# Patient Record
Sex: FEMALE | Race: WHITE
[De-identification: names, ages, dates, MRNs, and addresses within clinical notes are randomized per-mention and may not be internally consistent; named-entity substitution may affect disease eponyms.]

---

## 2018-01-30 ENCOUNTER — HOSPITAL ENCOUNTER (INPATIENT)
Dept: HOSPITAL 25 - MCHOBOUT | Age: 33
LOS: 3 days | Discharge: HOME | DRG: 540 | End: 2018-02-02
Attending: OBSTETRICS & GYNECOLOGY | Admitting: OBSTETRICS & GYNECOLOGY
Payer: COMMERCIAL

## 2018-01-30 DIAGNOSIS — O34.211: ICD-10-CM

## 2018-01-30 DIAGNOSIS — Z3A.39: ICD-10-CM

## 2018-01-30 DIAGNOSIS — F32.9: ICD-10-CM

## 2018-01-30 LAB
BASOPHILS # BLD AUTO: 0 10^3/UL (ref 0–0.2)
EOSINOPHIL # BLD AUTO: 0.1 10^3/UL (ref 0–0.6)
HCT VFR BLD AUTO: 34 % (ref 35–47)
HGB BLD-MCNC: 11.7 G/DL (ref 12–16)
LYMPHOCYTES # BLD AUTO: 0.9 10^3/UL (ref 1–4.8)
MCH RBC QN AUTO: 28 PG (ref 27–31)
MCHC RBC AUTO-ENTMCNC: 34 G/DL (ref 31–36)
MCV RBC AUTO: 81 FL (ref 80–97)
MONOCYTES # BLD AUTO: 0.5 10^3/UL (ref 0–0.8)
NEUTROPHILS # BLD AUTO: 5 10^3/UL (ref 1.5–7.7)
NRBC # BLD AUTO: 0 10^3/UL
NRBC BLD QL AUTO: 0.1
PLATELET # BLD AUTO: 254 10^3/UL (ref 150–450)
RBC # BLD AUTO: 4.23 10^6/UL (ref 4–5.4)
WBC # BLD AUTO: 6.6 10^3/UL (ref 3.5–10.8)

## 2018-01-30 PROCEDURE — 86900 BLOOD TYPING SEROLOGIC ABO: CPT

## 2018-01-30 PROCEDURE — 4A1HX4Z MONITORING OF PRODUCTS OF CONCEPTION, CARDIAC ELECTRICAL ACTIVITY, EXTERNAL APPROACH: ICD-10-PCS | Performed by: OBSTETRICS & GYNECOLOGY

## 2018-01-30 PROCEDURE — 88307 TISSUE EXAM BY PATHOLOGIST: CPT

## 2018-01-30 PROCEDURE — 86901 BLOOD TYPING SEROLOGIC RH(D): CPT

## 2018-01-30 PROCEDURE — 10907ZC DRAINAGE OF AMNIOTIC FLUID, THERAPEUTIC FROM PRODUCTS OF CONCEPTION, VIA NATURAL OR ARTIFICIAL OPENING: ICD-10-PCS | Performed by: OBSTETRICS & GYNECOLOGY

## 2018-01-30 PROCEDURE — 85025 COMPLETE CBC W/AUTO DIFF WBC: CPT

## 2018-01-30 PROCEDURE — 86850 RBC ANTIBODY SCREEN: CPT

## 2018-01-30 PROCEDURE — 36415 COLL VENOUS BLD VENIPUNCTURE: CPT

## 2018-01-30 RX ADMIN — IBUPROFEN PRN MG: 600 TABLET, FILM COATED ORAL at 23:16

## 2018-01-30 RX ADMIN — SIMETHICONE CHEW TAB 80 MG SCH: 80 TABLET ORAL at 20:10

## 2018-01-30 RX ADMIN — OXYCODONE HYDROCHLORIDE AND ACETAMINOPHEN PRN TAB: 5; 325 TABLET ORAL at 20:10

## 2018-01-30 RX ADMIN — SIMETHICONE CHEW TAB 80 MG SCH MG: 80 TABLET ORAL at 20:10

## 2018-01-30 RX ADMIN — DOCUSATE SODIUM SCH MG: 100 CAPSULE, LIQUID FILLED ORAL at 20:10

## 2018-01-30 RX ADMIN — FENTANYL CITRATE PRN MCG: 0.05 INJECTION, SOLUTION INTRAMUSCULAR; INTRAVENOUS at 15:43

## 2018-01-30 RX ADMIN — FENTANYL CITRATE PRN MCG: 0.05 INJECTION, SOLUTION INTRAMUSCULAR; INTRAVENOUS at 15:23

## 2018-01-31 LAB
BASOPHILS # BLD AUTO: 0.1 10^3/UL (ref 0–0.2)
EOSINOPHIL # BLD AUTO: 0.1 10^3/UL (ref 0–0.6)
HCT VFR BLD AUTO: 31 % (ref 35–47)
HGB BLD-MCNC: 10.5 G/DL (ref 12–16)
LYMPHOCYTES # BLD AUTO: 0.7 10^3/UL (ref 1–4.8)
MCH RBC QN AUTO: 27 PG (ref 27–31)
MCHC RBC AUTO-ENTMCNC: 34 G/DL (ref 31–36)
MCV RBC AUTO: 81 FL (ref 80–97)
MONOCYTES # BLD AUTO: 1 10^3/UL (ref 0–0.8)
NEUTROPHILS # BLD AUTO: 9.7 10^3/UL (ref 1.5–7.7)
NRBC # BLD AUTO: 0 10^3/UL
NRBC BLD QL AUTO: 0
PLATELET # BLD AUTO: 224 10^3/UL (ref 150–450)
RBC # BLD AUTO: 3.86 10^6/UL (ref 4–5.4)
WBC # BLD AUTO: 11.5 10^3/UL (ref 3.5–10.8)

## 2018-01-31 RX ADMIN — DOCUSATE SODIUM SCH MG: 100 CAPSULE, LIQUID FILLED ORAL at 13:57

## 2018-01-31 RX ADMIN — OXYCODONE HYDROCHLORIDE AND ACETAMINOPHEN PRN TAB: 5; 325 TABLET ORAL at 17:58

## 2018-01-31 RX ADMIN — FLUOXETINE SCH MG: 10 CAPSULE ORAL at 13:07

## 2018-01-31 RX ADMIN — IBUPROFEN PRN MG: 600 TABLET, FILM COATED ORAL at 06:37

## 2018-01-31 RX ADMIN — OXYCODONE HYDROCHLORIDE AND ACETAMINOPHEN PRN TAB: 5; 325 TABLET ORAL at 00:11

## 2018-01-31 RX ADMIN — HYDROCORTISONE SCH APPLIC: 1 CREAM TOPICAL at 18:48

## 2018-01-31 RX ADMIN — IBUPROFEN PRN MG: 600 TABLET, FILM COATED ORAL at 17:57

## 2018-01-31 RX ADMIN — SIMETHICONE CHEW TAB 80 MG SCH MG: 80 TABLET ORAL at 22:20

## 2018-01-31 RX ADMIN — DOCUSATE SODIUM SCH MG: 100 CAPSULE, LIQUID FILLED ORAL at 22:20

## 2018-01-31 RX ADMIN — SIMETHICONE CHEW TAB 80 MG SCH MG: 80 TABLET ORAL at 12:42

## 2018-01-31 RX ADMIN — OXYCODONE HYDROCHLORIDE AND ACETAMINOPHEN PRN TAB: 5; 325 TABLET ORAL at 22:20

## 2018-01-31 RX ADMIN — SIMETHICONE CHEW TAB 80 MG SCH MG: 80 TABLET ORAL at 17:57

## 2018-01-31 RX ADMIN — OXYCODONE HYDROCHLORIDE AND ACETAMINOPHEN PRN TAB: 5; 325 TABLET ORAL at 06:37

## 2018-01-31 RX ADMIN — OXYCODONE HYDROCHLORIDE AND ACETAMINOPHEN PRN TAB: 5; 325 TABLET ORAL at 13:57

## 2018-01-31 RX ADMIN — SIMETHICONE CHEW TAB 80 MG SCH MG: 80 TABLET ORAL at 10:02

## 2018-01-31 RX ADMIN — DOCUSATE SODIUM SCH MG: 100 CAPSULE, LIQUID FILLED ORAL at 10:02

## 2018-01-31 RX ADMIN — OXYCODONE HYDROCHLORIDE AND ACETAMINOPHEN PRN TAB: 5; 325 TABLET ORAL at 10:01

## 2018-01-31 RX ADMIN — IBUPROFEN PRN MG: 600 TABLET, FILM COATED ORAL at 12:42

## 2018-02-01 RX ADMIN — OXYCODONE HYDROCHLORIDE AND ACETAMINOPHEN PRN TAB: 5; 325 TABLET ORAL at 07:44

## 2018-02-01 RX ADMIN — OXYCODONE HYDROCHLORIDE AND ACETAMINOPHEN PRN TAB: 5; 325 TABLET ORAL at 12:12

## 2018-02-01 RX ADMIN — IBUPROFEN PRN MG: 600 TABLET, FILM COATED ORAL at 21:13

## 2018-02-01 RX ADMIN — SIMETHICONE CHEW TAB 80 MG SCH MG: 80 TABLET ORAL at 12:12

## 2018-02-01 RX ADMIN — DOCUSATE SODIUM SCH MG: 100 CAPSULE, LIQUID FILLED ORAL at 15:18

## 2018-02-01 RX ADMIN — DOCUSATE SODIUM SCH MG: 100 CAPSULE, LIQUID FILLED ORAL at 21:14

## 2018-02-01 RX ADMIN — SIMETHICONE CHEW TAB 80 MG SCH MG: 80 TABLET ORAL at 21:13

## 2018-02-01 RX ADMIN — IBUPROFEN PRN MG: 600 TABLET, FILM COATED ORAL at 00:36

## 2018-02-01 RX ADMIN — OXYCODONE HYDROCHLORIDE AND ACETAMINOPHEN PRN TAB: 5; 325 TABLET ORAL at 16:56

## 2018-02-01 RX ADMIN — DOCUSATE SODIUM SCH MG: 100 CAPSULE, LIQUID FILLED ORAL at 08:49

## 2018-02-01 RX ADMIN — IBUPROFEN PRN MG: 600 TABLET, FILM COATED ORAL at 07:43

## 2018-02-01 RX ADMIN — OXYCODONE HYDROCHLORIDE AND ACETAMINOPHEN PRN TAB: 5; 325 TABLET ORAL at 02:58

## 2018-02-01 RX ADMIN — FLUOXETINE SCH MG: 10 CAPSULE ORAL at 13:10

## 2018-02-01 RX ADMIN — SIMETHICONE CHEW TAB 80 MG SCH MG: 80 TABLET ORAL at 08:48

## 2018-02-01 RX ADMIN — IBUPROFEN PRN MG: 600 TABLET, FILM COATED ORAL at 15:18

## 2018-02-02 VITALS — SYSTOLIC BLOOD PRESSURE: 125 MMHG | DIASTOLIC BLOOD PRESSURE: 69 MMHG

## 2018-02-02 RX ADMIN — DOCUSATE SODIUM SCH MG: 100 CAPSULE, LIQUID FILLED ORAL at 08:18

## 2018-02-02 RX ADMIN — OXYCODONE HYDROCHLORIDE AND ACETAMINOPHEN PRN TAB: 5; 325 TABLET ORAL at 00:22

## 2018-02-02 RX ADMIN — SIMETHICONE CHEW TAB 80 MG SCH MG: 80 TABLET ORAL at 12:18

## 2018-02-02 RX ADMIN — SIMETHICONE CHEW TAB 80 MG SCH MG: 80 TABLET ORAL at 08:18

## 2018-02-02 RX ADMIN — FLUOXETINE SCH MG: 10 CAPSULE ORAL at 10:57

## 2018-02-02 RX ADMIN — HYDROCORTISONE SCH: 1 CREAM TOPICAL at 09:55

## 2018-02-02 RX ADMIN — IBUPROFEN PRN MG: 600 TABLET, FILM COATED ORAL at 10:18

## 2018-02-02 RX ADMIN — IBUPROFEN PRN MG: 600 TABLET, FILM COATED ORAL at 03:54

## 2018-02-02 RX ADMIN — OXYCODONE HYDROCHLORIDE AND ACETAMINOPHEN PRN TAB: 5; 325 TABLET ORAL at 08:18

## 2018-02-02 RX ADMIN — OXYCODONE HYDROCHLORIDE AND ACETAMINOPHEN PRN TAB: 5; 325 TABLET ORAL at 12:18

## 2018-02-02 RX ADMIN — SIMETHICONE CHEW TAB 80 MG SCH: 80 TABLET ORAL at 08:11

## 2018-02-02 RX ADMIN — HYDROCORTISONE SCH: 1 CREAM TOPICAL at 08:11

## 2018-02-03 ENCOUNTER — HOSPITAL ENCOUNTER (EMERGENCY)
Dept: HOSPITAL 25 - ED | Age: 33
Discharge: HOME | End: 2018-02-03
Payer: COMMERCIAL

## 2018-02-03 VITALS — SYSTOLIC BLOOD PRESSURE: 136 MMHG | DIASTOLIC BLOOD PRESSURE: 80 MMHG

## 2018-02-03 LAB
BASOPHILS # BLD AUTO: 0 10^3/UL (ref 0–0.2)
EOSINOPHIL # BLD AUTO: 0.1 10^3/UL (ref 0–0.6)
HCT VFR BLD AUTO: 30 % (ref 35–47)
HGB BLD-MCNC: 9.8 G/DL (ref 12–16)
INR PPP/BLD: 0.91 (ref 0.77–1.02)
LYMPHOCYTES # BLD AUTO: 0.5 10^3/UL (ref 1–4.8)
MCH RBC QN AUTO: 27 PG (ref 27–31)
MCHC RBC AUTO-ENTMCNC: 33 G/DL (ref 31–36)
MCV RBC AUTO: 81 FL (ref 80–97)
MONOCYTES # BLD AUTO: 0.4 10^3/UL (ref 0–0.8)
NEUTROPHILS # BLD AUTO: 6.9 10^3/UL (ref 1.5–7.7)
NRBC # BLD AUTO: 0 10^3/UL
NRBC BLD QL AUTO: 0.1
PLATELET # BLD AUTO: 273 10^3/UL (ref 150–450)
RBC # BLD AUTO: 3.64 10^6/UL (ref 4–5.4)
WBC # BLD AUTO: 7.9 10^3/UL (ref 3.5–10.8)

## 2018-02-03 PROCEDURE — 81003 URINALYSIS AUTO W/O SCOPE: CPT

## 2018-02-03 PROCEDURE — 36415 COLL VENOUS BLD VENIPUNCTURE: CPT

## 2018-02-03 PROCEDURE — 85610 PROTHROMBIN TIME: CPT

## 2018-02-03 PROCEDURE — 87086 URINE CULTURE/COLONY COUNT: CPT

## 2018-02-03 PROCEDURE — 87502 INFLUENZA DNA AMP PROBE: CPT

## 2018-02-03 PROCEDURE — 85025 COMPLETE CBC W/AUTO DIFF WBC: CPT

## 2018-02-03 PROCEDURE — 96360 HYDRATION IV INFUSION INIT: CPT

## 2018-02-03 PROCEDURE — 84484 ASSAY OF TROPONIN QUANT: CPT

## 2018-02-03 PROCEDURE — 83605 ASSAY OF LACTIC ACID: CPT

## 2018-02-03 PROCEDURE — 80053 COMPREHEN METABOLIC PANEL: CPT

## 2018-02-03 PROCEDURE — 85730 THROMBOPLASTIN TIME PARTIAL: CPT

## 2018-02-03 PROCEDURE — 81015 MICROSCOPIC EXAM OF URINE: CPT

## 2018-02-03 PROCEDURE — 71046 X-RAY EXAM CHEST 2 VIEWS: CPT

## 2018-02-03 PROCEDURE — 99283 EMERGENCY DEPT VISIT LOW MDM: CPT

## 2018-02-03 PROCEDURE — 87040 BLOOD CULTURE FOR BACTERIA: CPT

## 2018-02-03 RX ADMIN — SODIUM CHLORIDE ONE ML: 900 IRRIGANT IRRIGATION at 06:28

## 2018-02-03 RX ADMIN — SODIUM CHLORIDE ONE ML: 900 IRRIGANT IRRIGATION at 07:59

## 2018-02-03 NOTE — RAD
INDICATION: Fever 3 days status post 



COMPARISON: None



TECHNIQUE: PA and lateral views of the chest were obtained.



FINDINGS:



The heart and mediastinum are normal in size and contour.



The lungs are grossly clear. There is no evidence of large pleural effusion.



Visualized bones are normal for the patient's age.



There is no radiographic evidence of free air beneath the diaphragm



IMPRESSION: 

No radiographic evidence of acute cardiopulmonary disease.

## 2018-02-07 NOTE — ED
Sanjay WILLIAMSON Tecjoon, scribed for Leonel Up MD on 18 at 0528 .





HPI Febrile Illness





- HPI Summary


HPI Summary: 





This patient is a 32 year old female presenting to [facility] accompanied by family 

with a chief complaint of fever since yesterday. Patient recently gave birth to 

her second child 4 days ago. Child was born through emergency  due to 

a prolapsed umbilical cord. Patients child was full term, at 39 weeks. Patient 

was discharged yesterday and woke up with a high fever. The pain is rated 3/10 

in severity. Symptoms aggravated by nothing. Symptoms alleviated by nothing


The patient treated the pain with Percocet and ibuprofen on and off while in 

the hospital. Patient additionally reports a mild cough. Patient denies nausea, 

vomiting, dysuria, SOB.





- History of Current Complaint


Chief Complaint: EDFever


Hx Obtained From: Patient


Hx Last Menstrual Period: 14


Onset/Duration: Started Days Ago, Still Present


Timing: Constant


Temperature: 40 C - when she "woke up"


Initial Severity: Moderate


Current Severity: Mild


Pain Intensity: 3


Pain Scale Used: 0-10 Numeric


Aggravating Factors: Nothing


Alleviating Factors: Nothing


Associated Signs and Symptoms: Negative -  nausea, vomiting, dysuria, SOB, Other

: - mild cough





- Allergy/Home Medications


Allergies/Adverse Reactions: 


 Allergies











Allergy/AdvReac Type Severity Reaction Status Date / Time


 


No Known Allergies Allergy   Verified 18 03:32














PMH/Surg Hx/FS Hx/Imm Hx


Previously Healthy: Yes


Endocrine/Hematology History: 


   Denies: Hx Anticoagulant Therapy, Hx Blood Disorders, Hx Diabetes, Hx 

Thyroid Disease


Cardiovascular History: 


   Denies: Hx Hypertension


Respiratory History: 


   Denies: Hx Asthma, Hx Chronic Obstructive Pulmonary Disease (COPD)


GI History: 


   Denies: Hx Ulcer


Musculoskeletal History: 


   Denies: Hx Scoliosis


Sensory History: Reports: Hx Contacts or Glasses - GLASSES


   Denies: Hx Hearing Aid


Opthamlomology History: Reports: Hx Contacts or Glasses - GLASSES


Neurological History: Reports: Hx Headaches, Hx Migraine - 1.5 YEARS AGO


   Denies: Other Neuro Impairments/Disorders


Psychiatric History: Reports: Hx Anxiety, Hx Depression





- Surgical History


Surgery Procedure, Year, and Place: left arm surgery x2 /.  cyst 

removed from neck x2.   


Hx Anesthesia Reactions: No





- Immunization History


Date of Influenza Vaccine: 2017


Infectious Disease History: No


Infectious Disease History: 


   Denies: Hx Clostridium Difficile, Hx Hepatitis, Hx Human Immunodeficiency 

Virus (HIV), Hx of Known/Suspected MRSA, Hx Shingles, Hx Tuberculosis, History 

Other Infectious Disease, Traveled Outside the [place] in Last 30 Days





- Family History


Known Family History: 


   Negative: Hypertension





- Social History


Lives: With Family


Alcohol Use: Occasionally


Hx Substance Use: No


Substance Use Type: Reports: None


Hx Tobacco Use: No


Smoking Status (MU): Never Smoked Tobacco


Have You Smoked in the Last Year: No





Review of Systems


Positive: Fever - yesterday, 104


Positive: Cough.  Negative: Shortness Of Breath


Negative: Vomiting, Nausea


Negative: dysuria


All Other Systems Reviewed And Are Negative: Yes





Physical Exam





- Summary


Physical Exam Summary: 





Appearance: Well-appearing, Well-nourished


Skin: Warm


Eyes: Normal


ENT: Normal


Neck: Supple, nontender


Respiratory: Clear to auscultation


Cardiovascular: Normal S1, S2. No murmurs. Normal distal pulses in tibial and 

radial bilaterally.


Abdomen: Soft, nontender


Musculoskeletal: Normal, Strength/ROM Intact


Neurological: Normal, A&Ox3


Psychiatric: Normal


Triage Information Reviewed: Yes


Vital Signs On Initial Exam: 


 Initial Vitals











Temp Pulse Resp BP Pulse Ox


 


 99.4 F   134   18   135/77   97 


 


 18 01:23  18 01:23  18 01:23  18 01:23  18 01:23











Vital Signs Reviewed: Yes





Diagnostics





- Vital Signs


 Vital Signs











  Temp Pulse Resp BP Pulse Ox


 


 18 05:00   101   125/72  97


 


 18 04:32   103   137/78  98


 


 18 04:00   107   131/74  98


 


 18 03:27   108    98


 


 18 03:26     142/86 


 


 18 01:23  99.4 F  134  18  135/77  97














- Laboratory


Lab Results: 


 Lab Results











  18 Range/Units





  05:47 05:47 05:47 


 


WBC   7.9   (3.5-10.8)  10^3/ul


 


RBC   3.64 L   (4.0-5.4)  10^6/ul


 


Hgb   9.8 L   (12.0-16.0)  g/dl


 


Hct   30 L   (35-47)  %


 


MCV   81   (80-97)  fL


 


MCH   27   (27-31)  pg


 


MCHC   33   (31-36)  g/dl


 


RDW   15   (10.5-15)  %


 


Plt Count   273   (150-450)  10^3/ul


 


MPV   9   (7.4-10.4)  um3


 


Neut % (Auto)   86.9 H   (38-83)  %


 


Lymph % (Auto)   6.9 L   (25-47)  %


 


Mono % (Auto)   4.5   (1-9)  %


 


Eos % (Auto)   1.1   (0-6)  %


 


Baso % (Auto)   0.6   (0-2)  %


 


Absolute Neuts (auto)   6.9   (1.5-7.7)  10^3/ul


 


Absolute Lymphs (auto)   0.5 L   (1.0-4.8)  10^3/ul


 


Absolute Monos (auto)   0.4   (0-0.8)  10^3/ul


 


Absolute Eos (auto)   0.1   (0-0.6)  10^3/ul


 


Absolute Basos (auto)   0   (0-0.2)  10^3/ul


 


Absolute Nucleated RBC   0   10^3/ul


 


Nucleated RBC %   0.1   


 


INR (Anticoag Therapy)  0.91    (0.77-1.02)  


 


APTT  30.1    (26.0-36.3)  seconds


 


Sodium    136  (133-145)  mmol/L


 


Potassium    3.8  (3.5-5.0)  mmol/L


 


Chloride    106  (101-111)  mmol/L


 


Carbon Dioxide    22  (22-32)  mmol/L


 


Anion Gap    8  (2-11)  mmol/L


 


BUN    10  (6-24)  mg/dL


 


Creatinine    0.55  (0.51-0.95)  mg/dL


 


Est GFR ( Amer)    162.7  (>60)  


 


Est GFR (Non-Af Amer)    126.5  (>60)  


 


BUN/Creatinine Ratio    18.2  (8-20)  


 


Glucose    91  ()  mg/dL


 


Lactic Acid     (0.5-2.0)  mmol/L


 


Calcium    8.7  (8.6-10.3)  mg/dL


 


Total Bilirubin    0.30  (0.2-1.0)  mg/dL


 


AST    41 H  (13-39)  U/L


 


ALT    47  (7-52)  U/L


 


Alkaline Phosphatase    93  ()  U/L


 


Troponin I    0.01  (<0.04)  ng/mL


 


Total Protein    6.2 L  (6.4-8.9)  g/dL


 


Albumin    3.1 L  (3.2-5.2)  g/dL


 


Globulin    3.1  (2-4)  g/dL


 


Albumin/Globulin Ratio    1.0  (1-3)  


 


Urine Color     


 


Urine Appearance     


 


Urine pH     (5-9)  


 


Ur Specific Gravity     (1.010-1.030)  


 


Urine Protein     (Negative)  


 


Urine Ketones     (Negative)  


 


Urine Blood     (Negative)  


 


Urine Nitrate     (Negative)  


 


Urine Bilirubin     (Negative)  


 


Urine Urobilinogen     (Negative)  


 


Ur Leukocyte Esterase     (Negative)  


 


Urine WBC (Auto)     (Absent)  


 


Urine RBC (Auto)     (Absent)  


 


Ur Squamous Epith Cells     (Absent)  


 


Urine Bacteria     (Absent)  


 


Urine Glucose     (Negative)  


 


Influenza A (Rapid)     (Negative)  


 


Influenza B (Rapid)     (Negative)  














  18 Range/Units





  05:47 06:51 07:30 


 


WBC     (3.5-10.8)  10^3/ul


 


RBC     (4.0-5.4)  10^6/ul


 


Hgb     (12.0-16.0)  g/dl


 


Hct     (35-47)  %


 


MCV     (80-97)  fL


 


MCH     (27-31)  pg


 


MCHC     (31-36)  g/dl


 


RDW     (10.5-15)  %


 


Plt Count     (150-450)  10^3/ul


 


MPV     (7.4-10.4)  um3


 


Neut % (Auto)     (38-83)  %


 


Lymph % (Auto)     (25-47)  %


 


Mono % (Auto)     (1-9)  %


 


Eos % (Auto)     (0-6)  %


 


Baso % (Auto)     (0-2)  %


 


Absolute Neuts (auto)     (1.5-7.7)  10^3/ul


 


Absolute Lymphs (auto)     (1.0-4.8)  10^3/ul


 


Absolute Monos (auto)     (0-0.8)  10^3/ul


 


Absolute Eos (auto)     (0-0.6)  10^3/ul


 


Absolute Basos (auto)     (0-0.2)  10^3/ul


 


Absolute Nucleated RBC     10^3/ul


 


Nucleated RBC %     


 


INR (Anticoag Therapy)     (0.77-1.02)  


 


APTT     (26.0-36.3)  seconds


 


Sodium     (133-145)  mmol/L


 


Potassium     (3.5-5.0)  mmol/L


 


Chloride     (101-111)  mmol/L


 


Carbon Dioxide     (22-32)  mmol/L


 


Anion Gap     (2-11)  mmol/L


 


BUN     (6-24)  mg/dL


 


Creatinine     (0.51-0.95)  mg/dL


 


Est GFR ( Amer)     (>60)  


 


Est GFR (Non-Af Amer)     (>60)  


 


BUN/Creatinine Ratio     (8-20)  


 


Glucose     ()  mg/dL


 


Lactic Acid  0.7    (0.5-2.0)  mmol/L


 


Calcium     (8.6-10.3)  mg/dL


 


Total Bilirubin     (0.2-1.0)  mg/dL


 


AST     (13-39)  U/L


 


ALT     (7-52)  U/L


 


Alkaline Phosphatase     ()  U/L


 


Troponin I     (<0.04)  ng/mL


 


Total Protein     (6.4-8.9)  g/dL


 


Albumin     (3.2-5.2)  g/dL


 


Globulin     (2-4)  g/dL


 


Albumin/Globulin Ratio     (1-3)  


 


Urine Color    Yellow  


 


Urine Appearance    Cloudy  


 


Urine pH    6.0  (5-9)  


 


Ur Specific Gravity    1.008 L  (1.010-1.030)  


 


Urine Protein    Negative  (Negative)  


 


Urine Ketones    Negative  (Negative)  


 


Urine Blood    3+ H  (Negative)  


 


Urine Nitrate    Negative  (Negative)  


 


Urine Bilirubin    Negative  (Negative)  


 


Urine Urobilinogen    Negative  (Negative)  


 


Ur Leukocyte Esterase    3+ H  (Negative)  


 


Urine WBC (Auto)    3+(>20/hpf) H  (Absent)  


 


Urine RBC (Auto)    3+(>10/hpf) H  (Absent)  


 


Ur Squamous Epith Cells    Present H  (Absent)  


 


Urine Bacteria    1+ H  (Absent)  


 


Urine Glucose    Negative  (Negative)  


 


Influenza A (Rapid)   Negative   (Negative)  


 


Influenza B (Rapid)   Negative   (Negative)  














  18 Range/Units





  09:03 


 


WBC   (3.5-10.8)  10^3/ul


 


RBC   (4.0-5.4)  10^6/ul


 


Hgb   (12.0-16.0)  g/dl


 


Hct   (35-47)  %


 


MCV   (80-97)  fL


 


MCH   (27-31)  pg


 


MCHC   (31-36)  g/dl


 


RDW   (10.5-15)  %


 


Plt Count   (150-450)  10^3/ul


 


MPV   (7.4-10.4)  um3


 


Neut % (Auto)   (38-83)  %


 


Lymph % (Auto)   (25-47)  %


 


Mono % (Auto)   (1-9)  %


 


Eos % (Auto)   (0-6)  %


 


Baso % (Auto)   (0-2)  %


 


Absolute Neuts (auto)   (1.5-7.7)  10^3/ul


 


Absolute Lymphs (auto)   (1.0-4.8)  10^3/ul


 


Absolute Monos (auto)   (0-0.8)  10^3/ul


 


Absolute Eos (auto)   (0-0.6)  10^3/ul


 


Absolute Basos (auto)   (0-0.2)  10^3/ul


 


Absolute Nucleated RBC   10^3/ul


 


Nucleated RBC %   


 


INR (Anticoag Therapy)   (0.77-1.02)  


 


APTT   (26.0-36.3)  seconds


 


Sodium   (133-145)  mmol/L


 


Potassium   (3.5-5.0)  mmol/L


 


Chloride   (101-111)  mmol/L


 


Carbon Dioxide   (22-32)  mmol/L


 


Anion Gap   (2-11)  mmol/L


 


BUN   (6-24)  mg/dL


 


Creatinine   (0.51-0.95)  mg/dL


 


Est GFR ( Amer)   (>60)  


 


Est GFR (Non-Af Amer)   (>60)  


 


BUN/Creatinine Ratio   (8-20)  


 


Glucose   ()  mg/dL


 


Lactic Acid   (0.5-2.0)  mmol/L


 


Calcium   (8.6-10.3)  mg/dL


 


Total Bilirubin   (0.2-1.0)  mg/dL


 


AST   (13-39)  U/L


 


ALT   (7-52)  U/L


 


Alkaline Phosphatase   ()  U/L


 


Troponin I   (<0.04)  ng/mL


 


Total Protein   (6.4-8.9)  g/dL


 


Albumin   (3.2-5.2)  g/dL


 


Globulin   (2-4)  g/dL


 


Albumin/Globulin Ratio   (1-3)  


 


Urine Color  Yellow  


 


Urine Appearance  Clear  


 


Urine pH  6.0  (5-9)  


 


Ur Specific Gravity  1.011  (1.010-1.030)  


 


Urine Protein  Negative  (Negative)  


 


Urine Ketones  Negative  (Negative)  


 


Urine Blood  1+ H  (Negative)  


 


Urine Nitrate  Negative  (Negative)  


 


Urine Bilirubin  Negative  (Negative)  


 


Urine Urobilinogen  Negative  (Negative)  


 


Ur Leukocyte Esterase  Negative  (Negative)  


 


Urine WBC (Auto)  Trace(0-5/hpf)  (Absent)  


 


Urine RBC (Auto)  Trace(0-2/hpf)  (Absent)  


 


Ur Squamous Epith Cells  Present H  (Absent)  


 


Urine Bacteria  Absent  (Absent)  


 


Urine Glucose  Negative  (Negative)  


 


Influenza A (Rapid)   (Negative)  


 


Influenza B (Rapid)   (Negative)  











Result Diagrams: 


 18 05:47





 18 05:47


Lab Statement: Any lab studies that have been ordered have been reviewed, and 

results considered in the medical decision making process.





Course/Dx





- Course


Assessment/Plan: I spoke with Dr. Gonzalez who spoke with pt's surgeon, pt in no 

acute distress and well appearing. vitals stable, abx started here in ED and 

instructed to return for any repeat episodes of fever. pt agrees to and 

understands dc instructions.tolerating po.





- Diagnoses


Provider Diagnoses: 


 Postpartum fever








- Provider Notifications


Discussed Care Of Patient With: Kavitha Gonzalez


Time Discussed With Above Provider: 09:00





Discharge





- Discharge Plan


Condition: Stable


Disposition: HOME


Prescriptions: 


Amoxicillin/Clavulanate TAB* [Augmentin *] 875 mg PO BID #20 tab


Patient Education Materials:  Fever in Adults (ED)


Referrals: 


Renato Ravi MD [Medical Doctor] - 


Kavitha Gonzalez MD [Medical Doctor] - 


Reji Barron NP [Primary Care Provider] - 


Additional Instructions: 


PLEASE COMPLETE WHOLE COURSE OF MEDICATIONS


PLEASE MAKE AN APPOINTMENT FIRST THING IN THE MORNING TO BE SEEN BY YOUR OB 

DOCTOR WITHIN 2-5 DAYS


PLEASE RETURN IMMEDIATELY TO THE ER IF YOU HAVE ANY WORSENING OR CONCERNING 

SYMPTOMS


PLEASE MAKE AN APPOINTMENT TO BE SEEN BY YOUR PRIMARY CARE DOCTOR WITHIN 1 WEEK





The documentation as recorded by the Sanjay dale Tecjoon accurately reflects 

the service I personally performed and the decisions made by me, Leonel Up MD.

## 2018-02-16 NOTE — OP
DATE OF OPERATION:  18 - ROOM #[id number]

 

DATE OF BIRTH:  [birth year]

 

SURGEON:  Renato Ravi MD

 

ASSISTANT:  Jacque Bravo MD

 

ANESTHESIA:  General anesthetic with endotracheal intubation.

 

PRE-OPERATIVE DIAGNOSES:  Intrauterine pregnancy at 39 weeks with pregnancy-
induced hypertension, prior  section, in labor.

 

POST-OPERATIVE DIAGNOSES:  Intrauterine pregnancy at 39 weeks with pregnancy- 
induced hypertension, prior  section in labor along with cord prolapse 
in labor.

 

OPERATIVE PROCEDURE:  Emergency repeat low transverse  section.

 

ESTIMATED BLOOD LOSS:  800 cc.

 

IV FLUIDS:  She received 1600 cc of IV crystalloid fluid.

 

URINE OUTPUT:  Clear.

 

COMPLICATIONS:  There were no complications during the procedure.

 

SPECIMEN SENT TO PATHOLOGY:  Cord gas and cord blood.

 

FINDINGS:  Delivery of a viable male infant with Apgars of 6 and 9, weighing 8 
pounds and 12 ounces.  The placenta was grossly intact with a 3-vessel cord 
noted, it was sent to Pathology.  The uterus, adnexa, bowel, bladder were all 
within normal limits.

 

DESCRIPTION OF PROCEDURE:  The patient was taken to the operating room after a 
cord prolapse was first noted in the labor and delivery while the patient was 
in labor. She was placed on the operating bed in the supine position with a 
leftward tilt where general anesthetic with endotracheal intubation was 
obtained without difficulty.  She was prepped and draped in a normal sterile 
fashion.  A Pfannenstiel skin incision was made with a knife and carried 
through the underlying layer of fascia.  The fascia was nicked in the midline 
and extended laterally with curved Robles scissors.  The fascia was grasped 
superiorly and inferiorly with Kocher clamps and dissected off sharply from the 
rectus muscle.  The peritoneum was identified after the rectus muscle was 
 in the midline bluntly.  The peritoneum was then ______ bluntly and 
extended inferiorly with sharp dissection. A bladder blade was inserted into 
the patient's abdomen and a bladder flap was created using Metzenbaum scissors 
over which a bladder blade was then reinserted. A low transverse skin incision 
was made with a knife and extended laterally with bandage scissors.  Amniotic 
sac was noted to be ruptured.  The infant's head was then grasped and delivered 
atraumatically.  The cord was clamped and cut and the infant was handed off to 
awaiting pediatrician.  Cord bloods were obtained as well as cord gas.  The 
placenta was then removed manually.  The uterus was then exteriorized, cleared 
of all clot and debris using moist laparotomy sponges.  The uterine incision 
was then closed using 0 Polysorb suture in a running locked fashion with a 
second imbricating layer of 0 Polysorb suture with good hemostasis noted.  At 
this point, the uterus was then returned to the patient's abdomen.  The gutters 
were then cleared of all clot and debris using moist laparotomy sponges. After 
sponges were removed, we proceeded to irrigate the patient's abdomen with 
normal saline.  Irrigation fluid was suctioned.  Hemostasis at the uterine 
incision was again noted.  All the instruments were removed from the patient's 
abdomen and peritoneum was then closed using 3-0 Polysorb suture in a running 
fashion.  The fascia was closed using 0 Polysorb suture in a running fashion 
and interrupted 3-0 Polysorb subcutaneous stitches were used to approximate 
Jere's fascia after which the skin was closed with staples. The patient 
tolerated the procedure well. Sponge, lap and needle counts were correct x2.  
She was then transferred to recovery room area in stable condition.

 

 [id number]/[facility] #: [id number]

MIKE

## 2020-01-14 ENCOUNTER — HOSPITAL ENCOUNTER (EMERGENCY)
Dept: HOSPITAL 25 - ED | Age: 35
Discharge: HOME | End: 2020-01-14
Payer: COMMERCIAL

## 2020-01-14 VITALS — SYSTOLIC BLOOD PRESSURE: 155 MMHG | DIASTOLIC BLOOD PRESSURE: 84 MMHG

## 2020-01-14 DIAGNOSIS — R20.0: ICD-10-CM

## 2020-01-14 DIAGNOSIS — F32.9: ICD-10-CM

## 2020-01-14 DIAGNOSIS — F41.9: ICD-10-CM

## 2020-01-14 DIAGNOSIS — R20.2: ICD-10-CM

## 2020-01-14 DIAGNOSIS — Z87.891: ICD-10-CM

## 2020-01-14 DIAGNOSIS — R07.89: Primary | ICD-10-CM

## 2020-01-14 LAB
ALBUMIN SERPL BCG-MCNC: 4.2 G/DL (ref 3.2–5.2)
ALBUMIN/GLOB SERPL: 1.6 {RATIO} (ref 1–3)
ALP SERPL-CCNC: 57 U/L (ref 34–104)
ALT SERPL W P-5'-P-CCNC: 11 U/L (ref 7–52)
ANION GAP SERPL CALC-SCNC: 6 MMOL/L (ref 2–11)
AST SERPL-CCNC: 12 U/L (ref 13–39)
BASOPHILS # BLD AUTO: 0.1 10^3/UL (ref 0–0.2)
BUN SERPL-MCNC: 16 MG/DL (ref 6–24)
BUN/CREAT SERPL: 18.4 (ref 8–20)
CALCIUM SERPL-MCNC: 8.9 MG/DL (ref 8.6–10.3)
CHLORIDE SERPL-SCNC: 106 MMOL/L (ref 101–111)
CK MB SERPL-MCNC: 0.8 NG/ML (ref 0.6–6.3)
CK SERPL-CCNC: 54 U/L (ref 10–223)
EOSINOPHIL # BLD AUTO: 0.1 10^3/UL (ref 0–0.6)
GLOBULIN SER CALC-MCNC: 2.6 G/DL (ref 2–4)
GLUCOSE SERPL-MCNC: 98 MG/DL (ref 70–100)
HCO3 SERPL-SCNC: 24 MMOL/L (ref 22–32)
HCT VFR BLD AUTO: 35 % (ref 35–47)
HGB BLD-MCNC: 11.9 G/DL (ref 12–16)
LYMPHOCYTES # BLD AUTO: 0.8 10^3/UL (ref 1–4.8)
MCH RBC QN AUTO: 27 PG (ref 27–31)
MCHC RBC AUTO-ENTMCNC: 34 G/DL (ref 31–36)
MCV RBC AUTO: 80 FL (ref 80–97)
MONOCYTES # BLD AUTO: 0.6 10^3/UL (ref 0–0.8)
NEUTROPHILS # BLD AUTO: 4.4 10^3/UL (ref 1.5–7.7)
NRBC # BLD AUTO: 0 10^3/UL
NRBC BLD QL AUTO: 0
PLATELET # BLD AUTO: 288 10^3/UL (ref 150–450)
POTASSIUM SERPL-SCNC: 4.2 MMOL/L (ref 3.5–5)
PROT SERPL-MCNC: 6.8 G/DL (ref 6.4–8.9)
RBC # BLD AUTO: 4.36 10^6 /UL (ref 3.7–4.87)
SODIUM SERPL-SCNC: 136 MMOL/L (ref 135–145)
TROPONIN I SERPL-MCNC: 0 NG/ML (ref ?–0.03)
TSH SERPL-ACNC: 1.06 MCIU/ML (ref 0.34–5.6)
WBC # BLD AUTO: 6 10^3/UL (ref 3.5–10.8)

## 2020-01-14 PROCEDURE — 81015 MICROSCOPIC EXAM OF URINE: CPT

## 2020-01-14 PROCEDURE — 82550 ASSAY OF CK (CPK): CPT

## 2020-01-14 PROCEDURE — 83880 ASSAY OF NATRIURETIC PEPTIDE: CPT

## 2020-01-14 PROCEDURE — 99283 EMERGENCY DEPT VISIT LOW MDM: CPT

## 2020-01-14 PROCEDURE — 81003 URINALYSIS AUTO W/O SCOPE: CPT

## 2020-01-14 PROCEDURE — 84484 ASSAY OF TROPONIN QUANT: CPT

## 2020-01-14 PROCEDURE — 80053 COMPREHEN METABOLIC PANEL: CPT

## 2020-01-14 PROCEDURE — 36415 COLL VENOUS BLD VENIPUNCTURE: CPT

## 2020-01-14 PROCEDURE — 84443 ASSAY THYROID STIM HORMONE: CPT

## 2020-01-14 PROCEDURE — 71045 X-RAY EXAM CHEST 1 VIEW: CPT

## 2020-01-14 PROCEDURE — 85025 COMPLETE CBC W/AUTO DIFF WBC: CPT

## 2020-01-14 PROCEDURE — 93005 ELECTROCARDIOGRAM TRACING: CPT

## 2020-01-14 PROCEDURE — 82553 CREATINE MB FRACTION: CPT

## 2020-01-14 NOTE — XMS REPORT
Continuity of Care Document (CCD)

 Created on:2019



Patient:Teresa Barros

Sex:Female

:1985

External Reference #:[id number]





Demographics







 Address  [address] [place] [address]

 

 Home Phone  [phone]

 

 Mobile Phone  [phone]

 

 Work Phone  [phone]

 

 Preferred Language  en

 

 Marital Status  Not  or 

 

 Latter day Affiliation  Unknown

 

 Race  White

 

 Ethnic Group  Not  or 









Author







 Name  Angy Bolden O.D.

 

 Address  [address] [place] [address]









Care Team Providers







 Name  Role  Phone

 

 Reji Barron - Nurse Practitioner  Care Team Information   [phone]
[phone]

 

 Silvestre Josue DO - Neurology  Care Team Information   [phone]
[phone]









Problems







 Active Problems  Provider  Date

 

 Depressive disorder    Onset: 

 

 Anxiety  Angy Bolden O.D.  Onset: 2019

 

 Migraine  Angy Bolden O.D.  Onset: 2019







Social History







 Type  Date  Description  Comments

 

 Birth Sex    Unknown  

 

 ETOH Use    Rarely consumes alcohol  

 

 Tobacco Use  Start: Unknown  Patient has never smoked  

 

 Recreational Drug Use    Denies Drug Use  

 

 Smoking Status  Reviewed: 19  Patient has never smoked  







Allergies, Adverse Reactions, Alerts







 Description

 

 No Known Drug Allergies







Medications







 Active Medications  SIG  Qnty  Indications  Ordering Provider  Date

 

 Bupropion Hydrochloride ER (XL)        Reji Barron  



                 150mg Tablets ER          



 24HR          

 

 Fluoxetine HCL        Reji Barron  



 40mg Capsules          

 

 Propranolol HCL ER        Reji Barron FNP  



    80mg Caps ER 24HR          



           

 

 Sumatriptan Succinate        Reji Barron FNP  



       50mg Tablets          







Immunizations







 Description

 

 No Information Available







Vital Signs







 Description

 

 No Information Available







Results







 Description

 

 No Information Available







Procedures







 Description

 

 No Information Available







Medical Devices







 Description

 

 No Information Available







Encounters







 Description

 

 No Information Available







Assessments







 Date  Code  Description  Provider

 

 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







Plan of Treatment

2019 - Angy Bolden O.D.H35.413 Lattice degeneration of retina, 
bilateralFollow up:1 Year Follow Up  You can expect to have your eyes dilated 
at your next visit. If Dr. Bolden orders any additional testing, it may 
require extra time. We recommend that you bring sunglasses, as dilation drops 
often make you light sensitive until they wear off. We always recommend you 
bring someone to drive you home if you are uncomfortable driving with your eyes 
dilated. If you have any questions before your next visit, feel free to call 
our office at [phone].



Functional Status







 Description

 

 No Information Available







Mental Status







 Description

 

 No Information Available







Referrals







 Description

 

 No Information Available

## 2020-01-14 NOTE — ED
HPI Chest Pain





- HPI Summary


HPI Summary: 





This pt is a 35 y/o female presenting to [facility] via EMS c/o left sided chest 

pain since 1700 today. Pt reports she was doing house work today at home when 

she suddenly felt dizzy and sat down. She notes she then began to have chest 

pain. Pt states she ate some chocolate because she had not eaten since 

breakfast. She reports initially felt a little better but then her chest pain 

began worsening. At onset she rates her chest pain 9/10 in severity and 

describes it as a sharp pain. Additionally pt felt left arm and left leg 

numbness and tingling. Denies associated symptoms of fever, SOB, nausea, 

vomiting. EMS administered nitroglycerin x2 and 324 mg aspirin with moderate 

relief. Currently pt rates her chest pain 5/10 in severity.


Pt reports she has had this pain in the past, last summer, but it lasted 1-2 

weeks with gradual onset for which she went to the hospital and was diagnosed 

with GERD. 


PMHx: migraines, anxiety, depression, GERD.


Pt is a former smoker, quit 15 years ago. She admits to occasional alcohol and 

marijuana use. 


FHx: uncle with fatal MI at age 41 and father with fatal MI at age 56.





- History of Current Complaint


Time Seen by Provider: 20 19:00


Hx Obtained From: Patient


Hx Last Menstrual Period: 14


Onset/Duration: Started Hours Ago, Still Present


Timing: Lasting Hours


Initial Severity: Severe


Current Severity: Moderate


Pain Intensity: 5


Pain Scale Used: 0-10 Numeric


Chest Pain Location: Left Anterior


Chest Pain Radiates: No


Character: Sharp/Stabbing - sharp


Aggravating Factor(s): Nothing


Alleviating Factor(s): Nothing


Associated Signs and Symptoms: Positive: Chest Pain, Numbness, Tingling.  

Negative: Shortness of Breath, Fever, Chills, Nausea, Vomiting





- Allergy/Home Medications


Allergies/Adverse Reactions: 


 Allergies











Allergy/AdvReac Type Severity Reaction Status Date / Time


 


No Known Allergies Allergy   Verified 20 19:01











Home Medications: 


 Home Medications





Bupropion XL* [Wellbutrin XL *] 150 mg PO QPM 20 [History Confirmed ]


Bupropion XL* [Wellbutrin XL *] 300 mg PO QAM 20 [History Confirmed ]


Fluoxetine (Nf) Cap [Fluoxetine HCl] 40 mg PO DAILY 20 [History Confirmed 

20]


Propranolol HCl [Propranolol HCl ER] 80 mg PO DAILY 20 [History Confirmed 

20]











PMH/Surg Hx/FS Hx/Imm Hx


Endocrine/Hematology History: 


   Denies: Hx Anticoagulant Therapy, Hx Blood Disorders, Hx Diabetes, Hx 

Thyroid Disease


Cardiovascular History: 


   Denies: Hx Hypertension


Respiratory History: 


   Denies: Hx Asthma, Hx Chronic Obstructive Pulmonary Disease (COPD)


GI History: 


   Denies: Hx Ulcer


Musculoskeletal History: 


   Denies: Hx Scoliosis


Sensory History: Reports: Hx Contacts or Glasses - GLASSES


   Denies: Hx Hearing Aid


Opthamlomology History: Reports: Hx Contacts or Glasses - GLASSES


Neurological History: Reports: Hx Headaches, Hx Migraine - 1.5 YEARS AGO


   Denies: Other Neuro Impairments/Disorders


Psychiatric History: Reports: Hx Anxiety, Hx Depression





- Surgical History


Surgical History: Yes


Surgery Procedure, Year, and Place: left arm surgery x2 /.  cyst 

removed from neck x2.   .  exploratory surgery to remove IUD


Hx Anesthesia Reactions: No





- Immunization History


Date of Influenza Vaccine: 2019


Immunizations Up to Date: Yes


Infectious Disease History: No


Infectious Disease History: 


   Denies: Hx Clostridium Difficile, Hx Hepatitis, Hx Human Immunodeficiency 

Virus (HIV), Hx of Known/Suspected MRSA, Hx Shingles, Hx Tuberculosis, History 

Other Infectious Disease, Traveled Outside the [place] in Last 30 Days





- Family History


Known Family History: 


   Negative: Hypertension





- Social History


Alcohol Use: Occasionally


Hx Substance Use: No


Substance Use Type: Reports: Marijuana


Substance Use Comment - Amount & Last Used: last smoked today


Hx Tobacco Use: No


Smoking Status (MU): Former Smoker


Have You Smoked in the Last Year: No





Review of Systems


Negative: Fever


Positive: Chest Pain


Negative: Shortness Of Breath


Negative: Vomiting, Nausea


Positive: Paresthesia, Numbness


All Other Systems Reviewed And Are Negative: Yes





Physical Exam





- Summary


Physical Exam Summary: 





VITAL SIGNS: Reviewed.


GENERAL: Patient is a well-developed and nourished female who is lying 

comfortable in the stretcher. Patient is not in any acute respiratory distress.


HEAD AND FACE: No signs of trauma. No ecchymosis, hematomas or skull 

depressions. No sinus tenderness.


EYES: PERRLA, EOMI x 2, No injected conjunctiva, no nystagmus.


EARS: Hearing grossly intact. Ear canals and tympanic membranes are within 

normal limits.


MOUTH: Oropharynx within normal limits.


NECK: Supple, trachea is midline, no adenopathy, no JVD, no carotid bruit, no c-

spine tenderness, neck with full ROM.


CHEST: Symmetric, reproducible chest pain.


LUNGS: Clear to auscultation bilaterally. No wheezing or crackles.


CVS: Regular rate and rhythm, S1 and S2 present, no murmurs or gallops 

appreciated.


ABDOMEN: Soft, non-tender. No signs of distention. No rebound, no guarding, and 

no masses palpated. Bowel sounds are normal.


EXTREMITIES: FROM in all major joints, no edema, no cyanosis or clubbing.


NEURO: Alert and oriented x 3. No acute neurological deficits. Speech is normal 

and follows commands.


SKIN: Dry and warm


Triage Information Reviewed: Yes


Vital Signs On Initial Exam: 


 Initial Vitals











Temp Pulse Resp BP Pulse Ox


 


 98 F   96   20   156/90   99 


 


 20 18:57  20 18:57  20 18:57  20 18:57  20 18:57











Vital Signs Reviewed: Yes





Procedures





- Sedation


Patient Received Moderate/Deep Sedation with Procedure: No





Diagnostics





- Vital Signs


 Vital Signs











  Temp Pulse Resp BP Pulse Ox


 


 20 19:03   89  19   97


 


 20 18:57  98 F  91  20  156/90  99














- Laboratory


Result Diagrams: 


 20 19:54





 20 19:54


Lab Statement: Any lab studies that have been ordered have been reviewed, and 

results considered in the medical decision making process.





- Radiology


  ** chest XR


Radiology Interpretation Completed By: ED Physician


Summary of Radiographic Findings: No acute process.





- EKG


  ** 19:17


Cardiac Rate: NL - at 78 bpm


EKG Rhythm: Sinus Rhythm


Summary of EKG Findings: EKG at 1917 shows sinus rhythm at a rate of 78 bpm. No 

ST elevations. EKG was reviewed and interpreted by ED physician.





Re-Evaluation





- Re-Evaluation


  ** First Eval


Re-Evaluation Time: 20:58


Comment: Reviewed results with pt. She will be discharged home.





Chest Pain Course/Dx





- Course


Assessment/Plan: This pt is a 35 y/o female presenting to [facility] via EMS c/o 

left sided chest pain since 1700 today. Pt reports she was doing house work 

today at home when she suddenly felt dizzy and sat down. She notes she then 

began to have chest pain. Pt states she ate some chocolate because she had not 

eaten since breakfast. She reports initially felt a little better but then her 

chest pain began worsening. At onset she rates her chest pain 9/10 in severity 

and describes it as a sharp pain. Additionally pt felt left arm and left leg 

numbness and tingling. Denies associated symptoms of fever, SOB, nausea, 

vomiting. EMS administered nitroglycerin x2 and 324 mg aspirin with moderate 

relief. Currently pt rates her chest pain 5/10 in severity.  Pt reports she has 

had this pain in the past, last summer, but it lasted 1-2 weeks with gradual 

onset for which she went to the hospital and was diagnosed with GERD.  PMHx: 

migraines, anxiety, depression, GERD.  Pt is a former smoker, quit 15 years 

ago. She admits to occasional alcohol and marijuana use.  FHx: uncle with fatal 

MI at age 41 and father with fatal MI at age 56.  Blood work without any 

significant abnormality.  Troponin is 0.00.  EKG shows a normal sinus rhythm 

without any ST elevations.  Chest x-ray shows no acute pathology.  In the ED 

course the patient continues to be asymptomatic.  Patient reports that all 

symptoms have resolved. Patient's HEART score is: 0 therefore, low suspicion 

for CAD. Patient is not hypoxic or tachycardic. Wells criteria 0, therefore no 

suspicion for PE. Patient has no abdominal bruit thus no suspicion for AAA. 

Patients pain does not radiate to the back and pain has resolved thus low 

suspicion for aortic dissection. I discussed all the findings and test results 

with the patient. Patient was instructed to return to the emergency room 

immediately if any of the symptoms return or worsen. Patient understands and 

agrees.  Plan of care was discussed with the patient and patient understands 

and agrees. All questions were answered at patient satisfaction.  There were no 

further complaints or concerns. Physical exam before discharge: CVS: S1 and S2 

present. No murmurs appreciated.  Abdominal exam before discharge: Soft, non-

tender. No signs of distention. No rebound no guarding, and no masses palpated. 

Bowel sounds are normal. Patient is alert and oriented x 3. Patient is 

hemodynamically stable.





- Chest Pain


Differential Diagnosis/HQI/PQRI: Acute MI, ACS, Angina, CHF, Chest Wall, GI 

Disease, Lower Respiratory Infection, Pulmonary Edema





- Diagnoses


Provider Diagnoses: 


 Atypical chest pain








Discharge ED





- Sign-Out/Discharge


Documenting (check all that apply): Patient Departure - Discharge home





- Discharge Plan


Condition: Stable


Disposition: HOME


Patient Education Materials:  Chest Pain (ED)


Referrals: 


Reji Barron NP [Primary Care Provider] - 


Additional Instructions: 


FOLLOW UP WITH YOUR PRIMARY CARE PROVIDER IN 2-3 DAYS. 





RETURN TO THE ED FOR ANY NEW OR WORSENING SYMPTOMS.





- Billing Disposition and Condition


Condition: STABLE


Disposition: Home





- Attestation Statements


Document Initiated by Scribe: Yes


Documenting Scribe: Meche Napier


Provider For Whom Scribe is Documenting (Include Credential): Helio De Guzman MD


Scribe Attestation: 


Meche WILLIAMSON scribed for Helio De Guzman MD on 20 at 2154. 


Scribe Documentation Reviewed: Yes


Provider Attestation: 


The documentation as recorded by the Meche dale accurately reflects 

the service I personally performed and the decisions made by Helio hernandez MD


Status of Scribe Document: Viewed